# Patient Record
Sex: MALE | Race: BLACK OR AFRICAN AMERICAN
[De-identification: names, ages, dates, MRNs, and addresses within clinical notes are randomized per-mention and may not be internally consistent; named-entity substitution may affect disease eponyms.]

---

## 2019-11-11 ENCOUNTER — HOSPITAL ENCOUNTER (EMERGENCY)
Dept: HOSPITAL 56 - MW.ED | Age: 39
Discharge: HOME | End: 2019-11-11
Payer: SELF-PAY

## 2019-11-11 DIAGNOSIS — R51: Primary | ICD-10-CM

## 2019-11-11 DIAGNOSIS — H53.8: ICD-10-CM

## 2019-11-11 DIAGNOSIS — Z79.899: ICD-10-CM

## 2019-11-11 NOTE — EDM.PDOC
ED HPI GENERAL MEDICAL PROBLEM





- General


Chief Complaint: Headache


Stated Complaint: BLURRY VISION


Time Seen by Provider: 11/11/19 16:26


Source of Information: Reports: Patient


History Limitations: Reports: No Limitations





- History of Present Illness


INITIAL COMMENTS - FREE TEXT/NARRATIVE: 





HISTORY AND PHYSICAL:





History of present illness:


Patient is a 38-year-old male presents to the ED with complaint of headache and 

blurred vision. Patient states over the past 4 days he will get a headache 

towards the end of the day after being on the computer or his phone. He states 

when he gets the headache he will get blurred vision up close but can see just 

fine far away. Patient states that he does wear glasses at night when he is 

driving. He states he does have a history of headaches but has not had the 

associated blurred vision before. He states his pain is in the front of the 

head and is sometimes throbbing. He denies floaters, double vision, eye pain, 

redness or drainage. He denies head injury or trauma, fevers, chills, nausea, 

or vomiting. 





Review of systems: 


As per history of present illness and below otherwise all systems reviewed and 

negative.





Past medical history: 


As per history of present illness and as reviewed below otherwise 

noncontributory.





Surgical history: 


As per history of present illness and as reviewed below otherwise 

noncontributory.





Social history: 


No reported history of drug or alcohol abuse.





Family history: 


As per history of present illness and as reviewed below otherwise 

noncontributory.





Physical exam:


General: Patient sitting comfortably in no acute distress and nontoxic appearing


HEENT: Atraumatic, normocephalic, pupils reactive, negative for conjunctival 

pallor or scleral icterus, mucous membranes moist, throat clear, neck supple, 

nontender, trachea midline. No meningeal signs. 


Lungs: Clear to auscultation, breath sounds equal bilaterally, chest nontender.


Heart: S1S2, regular, negative for clicks, rubs, or overt murmur.


Abdomen: Soft, nondistended, nontender. Negative for masses or 

hepatosplenomegaly. Negative for costovertebral tenderness. No rigidity, rebound

, guarding.


Pelvis: Stable nontender.


Genitourinary: Deferred.


Rectal: Deferred.


Extremities: Atraumatic, negative for cords or calf pain. Neurovascular 

unremarkable.


Neuro: Awake, alert, oriented. Cranial nerves II through XII unremarkable. 

Cerebellum unremarkable. Motor and sensory unremarkable throughout. Exam 

nonfocal.





Notes: Visual acuity is 20/25 left, 20/20 right, and 20/20 bilaterally 





Diagnostics:


Visual acuity 





Therapeutics:


[]





Prescriptions:








Impression: 


Headache, blurred vision 





Plan:


[]





Definitive disposition and diagnosis as appropriate pending reevaluation and 

review of above.





  ** Head


Pain Score (Numeric/FACES): 5





- Related Data


 Allergies











Allergy/AdvReac Type Severity Reaction Status Date / Time


 


No Known Allergies Allergy   Verified 11/11/19 16:45











Home Meds: 


 Home Meds





. [No Known Home Meds]  11/11/19 [History]


Losartan [Cozaar] 100 mg PO DAILY 11/11/19 [History]


Metoprolol Succinate/HCTZ [Metoprolol ER-Hctz 100-12.5 mg] 1 each PO ASDIRECTED 

11/11/19 [History]











ED ROS GENERAL





- Review of Systems


Review Of Systems: ROS reveals no pertinent complaints other than HPI.





- Physical Exam


Exam: See Below (see dictation)





Course





- Vital Signs


Last Recorded V/S: 


 Last Vital Signs











Temp  97.8 F   11/11/19 16:46


 


Pulse  86   11/11/19 16:46


 


Resp  16   11/11/19 16:46


 


BP  139/101 H  11/11/19 16:46


 


Pulse Ox  98   11/11/19 16:46














Departure





- Departure


Time of Disposition: 17:08


Disposition: Home, Self-Care 01


Condition: Good


Clinical Impression: 


 Headache, Blurred vision








- Discharge Information


Referrals: 


PCP,None [Primary Care Provider] - 


Forms:  ED Department Discharge


Additional Instructions: 


The following information is given to patients seen in the emergency department 

who are being discharged to home. This information is to outline your options 

for follow-up care. We provide all patients seen in our emergency department 

with a follow-up referral.





The need for follow-up, as well as the timing and circumstances, are variable 

depending upon the specifics of your emergency department visit.





If you don't have a primary care physician on staff, we will provide you with a 

referral. We always advise you to contact your personal physician following an 

emergency department visit to inform them of the circumstance of the visit and 

for follow-up with them and/or the need for any referrals to a consulting 

specialist.





The emergency department will also refer you to a specialist when appropriate. 

This referral assures that you have the opportunity for follow-up care with a 

specialist. All of these measure are taken in an effort to provide you with 

optimal care, which includes your follow-up.





Under all circumstances we always encourage you to contact your private 

physician who remains a resource for coordinating your care. When calling for 

follow-up care, please make the office aware that this follow-up is from your 

recent emergency room visit. If for any reason you are refused follow-up, 

please contact the [facility] Emergency 

Department at [phone] and asked to speak to the emergency department 

charge nurse.





[address] [address]


Phone: [phone]


Fax: [phone]





Alternate tylenol and ibuprofen as needed


Follow up with ophthalmology


Return to ED as needed as discussed